# Patient Record
Sex: FEMALE | Race: WHITE
[De-identification: names, ages, dates, MRNs, and addresses within clinical notes are randomized per-mention and may not be internally consistent; named-entity substitution may affect disease eponyms.]

---

## 2020-05-12 ENCOUNTER — HOSPITAL ENCOUNTER (OUTPATIENT)
Dept: HOSPITAL 95 - LAB | Age: 45
Discharge: HOME | End: 2020-05-12
Attending: OBSTETRICS & GYNECOLOGY
Payer: COMMERCIAL

## 2020-05-12 DIAGNOSIS — R30.9: ICD-10-CM

## 2020-05-12 DIAGNOSIS — Z01.419: Primary | ICD-10-CM

## 2020-05-14 LAB — OTHER STN SPEC: (no result)

## 2020-11-23 ENCOUNTER — HOSPITAL ENCOUNTER (OUTPATIENT)
Dept: HOSPITAL 95 - ORD | Age: 45
Discharge: HOME | End: 2020-11-23
Attending: OBSTETRICS & GYNECOLOGY
Payer: COMMERCIAL

## 2020-11-23 VITALS — HEIGHT: 65.75 IN | BODY MASS INDEX: 33.78 KG/M2 | WEIGHT: 207.68 LBS

## 2020-11-23 DIAGNOSIS — N84.1: ICD-10-CM

## 2020-11-23 DIAGNOSIS — E66.9: ICD-10-CM

## 2020-11-23 DIAGNOSIS — N94.6: Primary | ICD-10-CM

## 2020-11-23 DIAGNOSIS — N92.0: ICD-10-CM

## 2020-11-23 DIAGNOSIS — N80.2: ICD-10-CM

## 2020-11-23 PROCEDURE — 0UT9FZZ RESECTION OF UTERUS, VIA NATURAL OR ARTIFICIAL OPENING WITH PERCUTANEOUS ENDOSCOPIC ASSISTANCE: ICD-10-PCS | Performed by: OBSTETRICS & GYNECOLOGY

## 2020-11-23 PROCEDURE — 0UT7FZZ RESECTION OF BILATERAL FALLOPIAN TUBES, VIA NATURAL OR ARTIFICIAL OPENING WITH PERCUTANEOUS ENDOSCOPIC ASSISTANCE: ICD-10-PCS | Performed by: OBSTETRICS & GYNECOLOGY

## 2020-11-23 PROCEDURE — A9270 NON-COVERED ITEM OR SERVICE: HCPCS

## 2020-11-23 NOTE — NUR
PT HAS BEEN UP TO VOID X2, AMBULATED WITHOUT DIFFICULTY, REPORTS PAIN IS
TOLERABLE WITH OXYCODONE, DENIES ANY NAUSEA, ABD LAP INCISIONS C/D/I, PERIPAD
W/ VERY SCANT SS DRAINAGE, TOLERATING REGULAR FOOD FAIRLY WELL, DC
INSTRUCTIONS GIVEN, VERBALIZED UNDERSTANDING, PT DC'D HOME WITH , RX
FOR OXYCODONE GIVEN TO .